# Patient Record
Sex: FEMALE | Race: WHITE | NOT HISPANIC OR LATINO | Employment: UNEMPLOYED | URBAN - METROPOLITAN AREA
[De-identification: names, ages, dates, MRNs, and addresses within clinical notes are randomized per-mention and may not be internally consistent; named-entity substitution may affect disease eponyms.]

---

## 2020-05-17 ENCOUNTER — HOSPITAL ENCOUNTER (EMERGENCY)
Facility: HOSPITAL | Age: 20
Discharge: HOME | End: 2020-05-18
Attending: EMERGENCY MEDICINE | Admitting: EMERGENCY MEDICINE

## 2020-05-17 DIAGNOSIS — R07.89 ATYPICAL CHEST PAIN: Primary | ICD-10-CM

## 2020-05-17 DIAGNOSIS — G47.00 INSOMNIA, UNSPECIFIED TYPE: ICD-10-CM

## 2020-05-17 DIAGNOSIS — R60.0 BILATERAL LEG EDEMA: ICD-10-CM

## 2020-05-17 LAB
ALBUMIN SERPL-MCNC: 3 G/DL (ref 3.5–5.2)
ALBUMIN/GLOB SERPL: 0.9 G/DL
ALP SERPL-CCNC: 105 U/L (ref 39–117)
ALT SERPL W P-5'-P-CCNC: 12 U/L (ref 1–33)
ANION GAP SERPL CALCULATED.3IONS-SCNC: 12.1 MMOL/L (ref 5–15)
AST SERPL-CCNC: 11 U/L (ref 1–32)
BASOPHILS # BLD AUTO: 0.01 10*3/MM3 (ref 0–0.2)
BASOPHILS NFR BLD AUTO: 0.1 % (ref 0–1.5)
BILIRUB SERPL-MCNC: 0.3 MG/DL (ref 0.2–1.2)
BUN BLD-MCNC: 9 MG/DL (ref 6–20)
BUN/CREAT SERPL: 15 (ref 7–25)
CALCIUM SPEC-SCNC: 8.5 MG/DL (ref 8.6–10.5)
CHLORIDE SERPL-SCNC: 103 MMOL/L (ref 98–107)
CO2 SERPL-SCNC: 24.9 MMOL/L (ref 22–29)
CREAT BLD-MCNC: 0.6 MG/DL (ref 0.57–1)
DEPRECATED RDW RBC AUTO: 39 FL (ref 37–54)
EOSINOPHIL # BLD AUTO: 0.14 10*3/MM3 (ref 0–0.4)
EOSINOPHIL NFR BLD AUTO: 1.5 % (ref 0.3–6.2)
ERYTHROCYTE [DISTWIDTH] IN BLOOD BY AUTOMATED COUNT: 12.9 % (ref 12.3–15.4)
GFR SERPL CREATININE-BSD FRML MDRD: 126 ML/MIN/1.73
GFR SERPL CREATININE-BSD FRML MDRD: >150 ML/MIN/1.73
GLOBULIN UR ELPH-MCNC: 3.4 GM/DL
GLUCOSE BLD-MCNC: 94 MG/DL (ref 65–99)
HCT VFR BLD AUTO: 26.6 % (ref 34–46.6)
HGB BLD-MCNC: 9 G/DL (ref 12–15.9)
IMM GRANULOCYTES # BLD AUTO: 0.13 10*3/MM3 (ref 0–0.05)
IMM GRANULOCYTES NFR BLD AUTO: 1.4 % (ref 0–0.5)
LYMPHOCYTES # BLD AUTO: 2 10*3/MM3 (ref 0.7–3.1)
LYMPHOCYTES NFR BLD AUTO: 22.1 % (ref 19.6–45.3)
MCH RBC QN AUTO: 28.6 PG (ref 26.6–33)
MCHC RBC AUTO-ENTMCNC: 33.8 G/DL (ref 31.5–35.7)
MCV RBC AUTO: 84.4 FL (ref 79–97)
MONOCYTES # BLD AUTO: 0.75 10*3/MM3 (ref 0.1–0.9)
MONOCYTES NFR BLD AUTO: 8.3 % (ref 5–12)
NEUTROPHILS # BLD AUTO: 6.02 10*3/MM3 (ref 1.7–7)
NEUTROPHILS NFR BLD AUTO: 66.6 % (ref 42.7–76)
NRBC BLD AUTO-RTO: 0.1 /100 WBC (ref 0–0.2)
NT-PROBNP SERPL-MCNC: 342.7 PG/ML (ref 5–450)
PLATELET # BLD AUTO: 398 10*3/MM3 (ref 140–450)
PMV BLD AUTO: 9.8 FL (ref 6–12)
POTASSIUM BLD-SCNC: 3.4 MMOL/L (ref 3.5–5.2)
PROT SERPL-MCNC: 6.4 G/DL (ref 6–8.5)
RBC # BLD AUTO: 3.15 10*6/MM3 (ref 3.77–5.28)
SODIUM BLD-SCNC: 140 MMOL/L (ref 136–145)
TROPONIN T SERPL-MCNC: <0.01 NG/ML (ref 0–0.03)
WBC NRBC COR # BLD: 9.05 10*3/MM3 (ref 3.4–10.8)

## 2020-05-17 PROCEDURE — 83880 ASSAY OF NATRIURETIC PEPTIDE: CPT | Performed by: NURSE PRACTITIONER

## 2020-05-17 PROCEDURE — 93005 ELECTROCARDIOGRAM TRACING: CPT | Performed by: NURSE PRACTITIONER

## 2020-05-17 PROCEDURE — 85025 COMPLETE CBC W/AUTO DIFF WBC: CPT | Performed by: NURSE PRACTITIONER

## 2020-05-17 PROCEDURE — 99283 EMERGENCY DEPT VISIT LOW MDM: CPT

## 2020-05-17 PROCEDURE — 84484 ASSAY OF TROPONIN QUANT: CPT | Performed by: NURSE PRACTITIONER

## 2020-05-17 PROCEDURE — 93010 ELECTROCARDIOGRAM REPORT: CPT | Performed by: INTERNAL MEDICINE

## 2020-05-17 PROCEDURE — 80053 COMPREHEN METABOLIC PANEL: CPT | Performed by: NURSE PRACTITIONER

## 2020-05-17 RX ORDER — PRENATAL VIT NO.126/IRON/FOLIC 28MG-0.8MG
TABLET ORAL DAILY
COMMUNITY

## 2020-05-17 RX ORDER — ACYCLOVIR 400 MG/1
400 TABLET ORAL
COMMUNITY

## 2020-05-17 RX ORDER — SERTRALINE HYDROCHLORIDE 100 MG/1
100 TABLET, FILM COATED ORAL DAILY
COMMUNITY

## 2020-05-17 RX ADMIN — SODIUM CHLORIDE 500 ML: 9 INJECTION, SOLUTION INTRAVENOUS at 23:21

## 2020-05-18 ENCOUNTER — APPOINTMENT (OUTPATIENT)
Dept: CT IMAGING | Facility: HOSPITAL | Age: 20
End: 2020-05-18

## 2020-05-18 VITALS
HEIGHT: 61 IN | WEIGHT: 211 LBS | SYSTOLIC BLOOD PRESSURE: 140 MMHG | DIASTOLIC BLOOD PRESSURE: 82 MMHG | HEART RATE: 86 BPM | RESPIRATION RATE: 18 BRPM | TEMPERATURE: 97.1 F | OXYGEN SATURATION: 100 % | BODY MASS INDEX: 39.84 KG/M2

## 2020-05-18 LAB
HOLD SPECIMEN: NORMAL
HOLD SPECIMEN: NORMAL
WHOLE BLOOD HOLD SPECIMEN: NORMAL
WHOLE BLOOD HOLD SPECIMEN: NORMAL

## 2020-05-18 PROCEDURE — 71275 CT ANGIOGRAPHY CHEST: CPT

## 2020-05-18 PROCEDURE — 0 IOPAMIDOL PER 1 ML: Performed by: EMERGENCY MEDICINE

## 2020-05-18 RX ORDER — TEMAZEPAM 15 MG/1
15 CAPSULE ORAL NIGHTLY PRN
Qty: 5 CAPSULE | Refills: 0 | Status: SHIPPED | OUTPATIENT
Start: 2020-05-18

## 2020-05-18 RX ADMIN — IOPAMIDOL 95 ML: 755 INJECTION, SOLUTION INTRAVENOUS at 00:21

## 2020-05-18 NOTE — ED PROVIDER NOTES
MD ATTESTATION NOTE    The PHILIPPE and I have discussed this patient's history, physical exam, and treatment plan.  I have reviewed the documentation and personally had a face to face interaction with the patient. I affirm the documentation and agree with the treatment and plan.  The attached note describes my personal findings.    Patient was placed in face mask in first look. Patient was wearing facemask when I entered the room and throughout our encounter. I wore full protective equipment throughout this patient encounter including a face mask, and gloves. Hand hygiene was performed before donning protective equipment and after removal when leaving the room.      HPI:    Patient is 5 days post partum via . She reports onset of chest pain the day after her , which persists. She reports onset of pain in legs with swelling yesterday. No fever, cough. Some soa.    PE:    HEENT - unremarkable  Lungs - CTA  Chest without deformity or tenderness  Ext - trace edema bilateral LE with no calf tenderness.    EKG          EKG time: 2321  Rhythm/Rate: NSR rate 87  P waves and MN: normal  QRS, axis: normal axis, no conduction delays   ST and T waves: Inverted T in III     Interpreted Contemporaneously by me, independently viewed  No old for comparison.    Discussed findings with patient of negative CT angiogram. Recommended venous doppler of legs in morning for evaluation of the swelling. Patient states she lives up in [place] and would not be in this area for the test. I advised her to call her OB doctor in the morning for follow up of the leg swelling.    DIAGNOSIS:    Final diagnoses:   Atypical chest pain   Bilateral leg edema   Insomnia, unspecified type       DISPOSITION:    DISCHARGE    Patient discharged in stable condition.    Reviewed implications of results, diagnosis, meds, responsibility to follow up, warning signs and symptoms of possible worsening, potential complications and reasons to return to  ER.    Patient/Family voiced understanding of above instructions.    Discussed plan for discharge, as there is no emergent indication for admission. Patient referred to primary care provider for BP management due to today's BP. Pt/family is agreeable and understands need for follow up and repeat testing.  Pt is aware that discharge does not mean that nothing is wrong but it indicates no emergency is present that requires admission and they must continue care with follow-up as given below or physician of their choice.     FOLLOW-UP  Your OB doctor      Call tommorrow for follow up of leg swelling.         Medication List      New Prescriptions    temazepam 15 MG capsule  Commonly known as:  RESTORIL  Take 1 capsule by mouth At Night As Needed for Sleep.               Jagdish Hurst MD  05/18/20 0128

## 2020-05-18 NOTE — ED NOTES
Pt placed in mask upon arrival, RN in mask. Pt reports she has had chest pains, SOB and right leg swelling since she had a  5 days ago.     Sirena Hoffman, RN  20 1932     (0) independent

## 2020-05-18 NOTE — ED PROVIDER NOTES
" EMERGENCY DEPARTMENT ENCOUNTER    Room Number:    Date of encounter:  2020  PCP: System, Provider Not In  Historian: patient   Full history not obtainable due to: none     HPI:  Chief Complaint: SOA    Context: Ruth Friend is a 19 y.o. female who presents to the ED c/o soa onset 5 days ago. Intermittent, severe, worse at night and wakes her from sleep. Sitting improves the symptoms. Associated left sided chest pain which worsens with breathing. No cough. No fever. Also reports right leg swelling x 5 days and numbness in her right foot. No fall or injury. No back pain.    C section at 39 week gestation, scheduled, last Tuesday 5 days prior. No hx of blood clot. +Smoker, \"vapes\".         PAST MEDICAL HISTORY    Active Ambulatory Problems     Diagnosis Date Noted   • No Active Ambulatory Problems     Resolved Ambulatory Problems     Diagnosis Date Noted   • No Resolved Ambulatory Problems     Past Medical History:   Diagnosis Date   • Depression          PAST SURGICAL HISTORY  Past Surgical History:   Procedure Laterality Date   •  SECTION     • CHOLECYSTECTOMY           FAMILY HISTORY  History reviewed. No pertinent family history.      SOCIAL HISTORY  Social History     Socioeconomic History   • Marital status: Single     Spouse name: Not on file   • Number of children: Not on file   • Years of education: Not on file   • Highest education level: Not on file   Tobacco Use   • Smoking status: Current Every Day Smoker     Types: Electronic Cigarette   • Smokeless tobacco: Current User   Substance and Sexual Activity   • Alcohol use: Not Currently     Frequency: Never   • Drug use: Not Currently         ALLERGIES  Patient has no known allergies.        REVIEW OF SYSTEMS  Review of Systems   All systems reviewed and marked as negative except as listed in HPI       PHYSICAL EXAM    I have reviewed the triage vital signs and nursing notes.    ED Triage Vitals   Temp Heart Rate Resp BP SpO2   20 2249 " 05/17/20 2249 05/17/20 2249 05/17/20 2301 05/17/20 2249   97.1 °F (36.2 °C) 98 16 146/93 98 %      Temp src Heart Rate Source Patient Position BP Location FiO2 (%)   05/17/20 2249 -- -- -- --   Tympanic           GENERAL: alert well developed, well nourished in no distress  HENT: NCAT, neck supple, trachea midline  EYES: no scleral icterus, PERRL, normal conjunctiva  CV: regular rhythm, regular rate, no murmur  RESPIRATORY: unlabored effort, CTAB  ABDOMEN: soft, non-tender, non-distended, bowel sounds present  MUSCULOSKELETAL: no gross deformity. No unilateral lower extremity edema. No calf tenderness. No warmth or erythema noted, no rash.  NEURO: alert,  sensory and motor function of extremities grossly intact, speech clear, mental status normal/baseline  SKIN: warm, dry, no rash  PSYCH:  Appropriate mood and affect    Vital signs and nursing notes reviewed.          LAB RESULTS  Recent Results (from the past 24 hour(s))   Comprehensive Metabolic Panel    Collection Time: 05/17/20 11:20 PM   Result Value Ref Range    Glucose 94 65 - 99 mg/dL    BUN 9 6 - 20 mg/dL    Creatinine 0.60 0.57 - 1.00 mg/dL    Sodium 140 136 - 145 mmol/L    Potassium 3.4 (L) 3.5 - 5.2 mmol/L    Chloride 103 98 - 107 mmol/L    CO2 24.9 22.0 - 29.0 mmol/L    Calcium 8.5 (L) 8.6 - 10.5 mg/dL    Total Protein 6.4 6.0 - 8.5 g/dL    Albumin 3.00 (L) 3.50 - 5.20 g/dL    ALT (SGPT) 12 1 - 33 U/L    AST (SGOT) 11 1 - 32 U/L    Alkaline Phosphatase 105 39 - 117 U/L    Total Bilirubin 0.3 0.2 - 1.2 mg/dL    eGFR Non African Amer 126 >60 mL/min/1.73    eGFR  African Amer >150 >60 mL/min/1.73    Globulin 3.4 gm/dL    A/G Ratio 0.9 g/dL    BUN/Creatinine Ratio 15.0 7.0 - 25.0    Anion Gap 12.1 5.0 - 15.0 mmol/L   Troponin    Collection Time: 05/17/20 11:20 PM   Result Value Ref Range    Troponin T <0.010 0.000 - 0.030 ng/mL   BNP    Collection Time: 05/17/20 11:20 PM   Result Value Ref Range    proBNP 342.7 5.0 - 450.0 pg/mL   CBC Auto Differential     Collection Time: 05/17/20 11:20 PM   Result Value Ref Range    WBC 9.05 3.40 - 10.80 10*3/mm3    RBC 3.15 (L) 3.77 - 5.28 10*6/mm3    Hemoglobin 9.0 (L) 12.0 - 15.9 g/dL    Hematocrit 26.6 (L) 34.0 - 46.6 %    MCV 84.4 79.0 - 97.0 fL    MCH 28.6 26.6 - 33.0 pg    MCHC 33.8 31.5 - 35.7 g/dL    RDW 12.9 12.3 - 15.4 %    RDW-SD 39.0 37.0 - 54.0 fl    MPV 9.8 6.0 - 12.0 fL    Platelets 398 140 - 450 10*3/mm3    Neutrophil % 66.6 42.7 - 76.0 %    Lymphocyte % 22.1 19.6 - 45.3 %    Monocyte % 8.3 5.0 - 12.0 %    Eosinophil % 1.5 0.3 - 6.2 %    Basophil % 0.1 0.0 - 1.5 %    Immature Grans % 1.4 (H) 0.0 - 0.5 %    Neutrophils, Absolute 6.02 1.70 - 7.00 10*3/mm3    Lymphocytes, Absolute 2.00 0.70 - 3.10 10*3/mm3    Monocytes, Absolute 0.75 0.10 - 0.90 10*3/mm3    Eosinophils, Absolute 0.14 0.00 - 0.40 10*3/mm3    Basophils, Absolute 0.01 0.00 - 0.20 10*3/mm3    Immature Grans, Absolute 0.13 (H) 0.00 - 0.05 10*3/mm3    nRBC 0.1 0.0 - 0.2 /100 WBC   Light Blue Top    Collection Time: 05/17/20 11:20 PM   Result Value Ref Range    Extra Tube hold for add-on    Green Top (Gel)    Collection Time: 05/17/20 11:20 PM   Result Value Ref Range    Extra Tube Hold for add-ons.    Lavender Top    Collection Time: 05/17/20 11:20 PM   Result Value Ref Range    Extra Tube hold for add-on    Gold Top - SST    Collection Time: 05/17/20 11:20 PM   Result Value Ref Range    Extra Tube Hold for add-ons.        Ordered the above labs and independently reviewed the results.        RADIOLOGY  Ct Angiogram Chest    Result Date: 5/18/2020  CT ANGIOGRAM OF THE CHEST  HISTORY: Shortness of air and right leg swelling. Patient is recently postpartum.  COMPARISON: None available.  TECHNIQUE: Axial CT imaging was obtained from the thoracic inlet through the dome the diaphragm. IV contrast was administered. Three-D reformatted images were obtained.  FINDINGS: No acute pulmonary thromboembolus is identified. The thoracic aorta measures within normal size  limits. There is no evidence of dissection. There is normal arch anatomy. The thyroid gland, trachea, and esophagus appear unremarkable. There is no pleural or pericardial effusion. No suspicious pulmonary nodules or masses are seen. Images through the upper abdomen do not demonstrate any acute abnormalities. The right hepatic artery and common hepatic artery appear to have a common origin off of the celiac axis. No acute osseous abnormalities are seen.      No acute intrathoracic process identified.  Radiation dose reduction techniques were utilized, including automated exposure control and exposure modulation based on body size.  This report was finalized on 2020 12:53 AM by Dr. Petra Cash M.D.        I ordered the above noted radiological studies. Independently reviewed by me and discussed with radiologist.  See dictation above for official radiology interpretation.      PROCEDURES    Procedures        MEDICATIONS GIVEN IN ER    Medications   sodium chloride 0.9 % bolus 500 mL (0 mL Intravenous Stopped 20 0107)   iopamidol (ISOVUE-370) 76 % injection 100 mL (95 mL Intravenous Given by Other 20 0021)         PROGRESS, DATA ANALYSIS, CONSULTS, AND MEDICAL DECISION MAKING    All labs have been independently reviewed by me.  All radiology studies have been reviewed by me.   EKG's independently reviewed by me.  Discussion below represents my analysis of pertinent findings related to patient's condition, differential diagnosis, treatment plan and final disposition.    DIFFERENTIAL DIAGNOSIS INCLUDE BUT NOT LIMITED TO: ACS, AMI, pulmonary embolism, aortic dissection, postpartum cardiomyopathy, nonischemic cardiomyopathy, endocarditis, myocarditis, costochondritis, chest wall strain, anxiety    ED Course as of May 18 035   Mon May 18, 2020   0030 D-dimer testing was omitted as patient had  and delivered 5 days prior.  It would likely be positive without concurrent blood clot present and  would not change our choice of treatment.  CT of the chest is negative for pulmonary embolus or acute abnormality explaining chest pain and shortness of breath complaint.  Additionally the patient is not tachycardic or hypoxic and appears to objectively have no difficulty in breathing.  My suspicion for underlying PE not seen on CT scan is very low.  She is anemic with hemoglobin of 9 however she did just deliver and this will need to be rechecked by her OB/GYN but may explain some of her shortness of breath.  Given that she has risk factors for DVT including pregnancy and complaints of right leg swelling she will have an outpatient Doppler ordered for her first thing in the morning.  As she just had major surgery 5 days ago she will not be placed on prophylactic anticoagulation especially given that my suspicion for DVT is low.    [JS]   0100 I Viewed CT scan on PACS system.  My findings are no infiltrates.    [JS]      ED Course User Index  [JS] Alanna Mayorga APRN       AS OF 03:54 VITALS:    BP - 140/82  HR - 86  TEMP - 97.1 °F (36.2 °C) (Tympanic)  02 SATS - 100%        DIAGNOSIS  Final diagnoses:   Atypical chest pain   Bilateral leg edema   Insomnia, unspecified type         DISPOSITION  Discharge        Alanna Mayorga APRN  05/18/20 0348       Alanna Mayorga APRN  05/18/20 0354